# Patient Record
Sex: FEMALE | Race: BLACK OR AFRICAN AMERICAN | Employment: FULL TIME | URBAN - METROPOLITAN AREA
[De-identification: names, ages, dates, MRNs, and addresses within clinical notes are randomized per-mention and may not be internally consistent; named-entity substitution may affect disease eponyms.]

---

## 2018-06-27 ENCOUNTER — HOSPITAL ENCOUNTER (OUTPATIENT)
Age: 23
Discharge: HOME | End: 2018-06-27

## 2018-06-27 PROCEDURE — 86481 TB AG RESPONSE T-CELL SUSP: CPT

## 2018-06-30 LAB — T-SPOT TB TEST: NORMAL

## 2019-05-29 ENCOUNTER — EMPLOYEE WELLNESS (OUTPATIENT)
Dept: OTHER | Age: 24
End: 2019-05-29

## 2019-05-29 LAB
CHOLESTEROL/HDL RATIO: 2.7
CHOLESTEROL: 179 MG/DL
GLUCOSE BLD-MCNC: 93 MG/DL (ref 70–99)
HDLC SERPL-MCNC: 67 MG/DL
LDL CHOLESTEROL: 95 MG/DL (ref 0–130)
PATIENT FASTING?: YES
TRIGL SERPL-MCNC: 84 MG/DL
VLDLC SERPL CALC-MCNC: NORMAL MG/DL (ref 1–30)

## 2019-06-10 VITALS — WEIGHT: 139 LBS

## 2019-10-09 ENCOUNTER — HOSPITAL ENCOUNTER (EMERGENCY)
Age: 24
Discharge: HOME | End: 2019-10-09
Attending: EMERGENCY MEDICINE
Payer: COMMERCIAL

## 2019-10-09 VITALS
WEIGHT: 140 LBS | TEMPERATURE: 98.1 F | RESPIRATION RATE: 16 BRPM | HEART RATE: 79 BPM | DIASTOLIC BLOOD PRESSURE: 68 MMHG | OXYGEN SATURATION: 100 % | HEIGHT: 67 IN | BODY MASS INDEX: 21.97 KG/M2 | SYSTOLIC BLOOD PRESSURE: 112 MMHG
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
## 2019-10-09 DIAGNOSIS — S16.1XXA ACUTE STRAIN OF NECK MUSCLE, INITIAL ENCOUNTER: ICD-10-CM

## 2019-10-09 PROCEDURE — 99283 EMERGENCY DEPT VISIT LOW MDM: CPT

## 2019-10-09 RX ORDER — IBUPROFEN 600 MG/1
600 TABLET ORAL EVERY 6 HOURS PRN
Qty: 24 TABLET | Refills: 0 | Status: SHIPPED | OUTPATIENT
Start: 2019-10-09 | End: 2019-12-30 | Stop reason: ALTCHOICE

## 2019-10-09 RX ORDER — CYCLOBENZAPRINE HCL 5 MG
5 TABLET ORAL NIGHTLY PRN
Qty: 3 TABLET | Refills: 0 | Status: SHIPPED | OUTPATIENT
Start: 2019-10-09 | End: 2019-10-12

## 2019-10-09 ASSESSMENT — PAIN DESCRIPTION - PAIN TYPE: TYPE: ACUTE PAIN

## 2019-10-09 ASSESSMENT — PAIN DESCRIPTION - LOCATION: LOCATION: NECK;SHOULDER;WRIST

## 2019-10-09 ASSESSMENT — ENCOUNTER SYMPTOMS
VOMITING: 0
FACIAL SWELLING: 0
WHEEZING: 0
SHORTNESS OF BREATH: 0
COUGH: 0
ABDOMINAL PAIN: 0
NAUSEA: 0
VOICE CHANGE: 0
DIARRHEA: 0

## 2019-10-09 ASSESSMENT — PAIN SCALES - GENERAL: PAINLEVEL_OUTOF10: 4

## 2019-10-09 ASSESSMENT — PAIN DESCRIPTION - DESCRIPTORS: DESCRIPTORS: DULL

## 2019-10-09 ASSESSMENT — PAIN DESCRIPTION - FREQUENCY: FREQUENCY: CONTINUOUS

## 2019-12-30 ENCOUNTER — HOSPITAL ENCOUNTER (OUTPATIENT)
Age: 24
Setting detail: SPECIMEN
Discharge: HOME | End: 2019-12-30
Payer: COMMERCIAL

## 2019-12-30 ENCOUNTER — OFFICE VISIT (OUTPATIENT)
Dept: OBGYN CLINIC | Age: 24
End: 2019-12-30
Payer: COMMERCIAL

## 2019-12-30 VITALS
RESPIRATION RATE: 16 BRPM | HEIGHT: 67 IN | SYSTOLIC BLOOD PRESSURE: 110 MMHG | DIASTOLIC BLOOD PRESSURE: 64 MMHG | WEIGHT: 136 LBS | BODY MASS INDEX: 21.35 KG/M2

## 2019-12-30 DIAGNOSIS — Z01.419 WOMEN'S ANNUAL ROUTINE GYNECOLOGICAL EXAMINATION: Primary | ICD-10-CM

## 2019-12-30 PROCEDURE — 99385 PREV VISIT NEW AGE 18-39: CPT | Performed by: NURSE PRACTITIONER

## 2019-12-30 RX ORDER — M-VIT,TX,IRON,MINS/CALC/FOLIC 27MG-0.4MG
1 TABLET ORAL DAILY
COMMUNITY

## 2019-12-30 ASSESSMENT — ENCOUNTER SYMPTOMS
RHINORRHEA: 0
BACK PAIN: 0
NAUSEA: 0
COLOR CHANGE: 0
VOMITING: 0
SHORTNESS OF BREATH: 0
ABDOMINAL PAIN: 0
COUGH: 0
CONSTIPATION: 0
DIARRHEA: 0

## 2020-01-07 LAB — CYTOLOGY REPORT: NORMAL

## 2020-07-09 ENCOUNTER — OFFICE VISIT (OUTPATIENT)
Dept: OBGYN CLINIC | Age: 25
End: 2020-07-09
Payer: COMMERCIAL

## 2020-07-09 ENCOUNTER — HOSPITAL ENCOUNTER (OUTPATIENT)
Age: 25
Discharge: HOME | End: 2020-07-09
Payer: COMMERCIAL

## 2020-07-09 ENCOUNTER — HOSPITAL ENCOUNTER (OUTPATIENT)
Age: 25
Setting detail: SPECIMEN
Discharge: HOME | End: 2020-07-09
Payer: COMMERCIAL

## 2020-07-09 VITALS
WEIGHT: 145 LBS | SYSTOLIC BLOOD PRESSURE: 110 MMHG | DIASTOLIC BLOOD PRESSURE: 62 MMHG | BODY MASS INDEX: 22.71 KG/M2 | TEMPERATURE: 97.1 F

## 2020-07-09 LAB
HBV SURFACE AB TITR SER: >1000 MIU/ML
HEPATITIS B SURFACE ANTIGEN: NONREACTIVE
HEPATITIS C ANTIBODY: NONREACTIVE
HIV AG/AB: NONREACTIVE
T. PALLIDUM, IGG: NONREACTIVE

## 2020-07-09 PROCEDURE — 86317 IMMUNOASSAY INFECTIOUS AGENT: CPT

## 2020-07-09 PROCEDURE — 36415 COLL VENOUS BLD VENIPUNCTURE: CPT

## 2020-07-09 PROCEDURE — 99213 OFFICE O/P EST LOW 20 MIN: CPT | Performed by: NURSE PRACTITIONER

## 2020-07-09 PROCEDURE — 86803 HEPATITIS C AB TEST: CPT

## 2020-07-09 PROCEDURE — 86780 TREPONEMA PALLIDUM: CPT

## 2020-07-09 PROCEDURE — 87340 HEPATITIS B SURFACE AG IA: CPT

## 2020-07-09 PROCEDURE — 87389 HIV-1 AG W/HIV-1&-2 AB AG IA: CPT

## 2020-07-09 ASSESSMENT — ENCOUNTER SYMPTOMS
SHORTNESS OF BREATH: 0
VOMITING: 0
WHEEZING: 0
COLOR CHANGE: 0
NAUSEA: 0

## 2020-07-09 NOTE — PROGRESS NOTES
[facility] PHYSICIANS  [facility] [address]  [address] [address]  Dept: [phone]  Dept Fax: [phone]    Maribeth Ruvalcaba is a 30 y.o. female who presents today for her medical conditions/complaintsas noted below. Maribeth Ruvalcaba is c/o of Vaginal Discharge        HPI:     HPI  Pt is here with complaints of a vaginal discharge with Odor for 2 days, denies  Itching,  denies Burning. No UTI sx,  fevers, chills, nausea/vomiting. States did note left side pain intermittently 1 out of 10, states unsure if related to this. No recent antibiotics, changes in soaps. No change in partners, states has had unprotected sex with her partner though. Denies any STI hx.   LMP- 1 week ago      No past medical history on file. No past surgical history on file. Family History   Problem Relation Age of Onset    Hypertension Mother     High Cholesterol Mother     Hypertension Father     High Cholesterol Father        Social History     Tobacco Use    Smoking status: Never Smoker    Smokeless tobacco: Never Used   Substance Use Topics    Alcohol use: Yes      Current Outpatient Medications   Medication Sig Dispense Refill    Multiple Vitamins-Minerals (THERAPEUTIC MULTIVITAMIN-MINERALS) tablet Take 1 tablet by mouth daily       No current facility-administered medications for this visit. No Known Allergies    Health Maintenance   Topic Date Due    Varicella vaccine (1 of 2 - 2-dose childhood series) 04/14/1994    HIV screen  04/14/2008    DTaP/Tdap/Td vaccine (1 - Tdap) 04/14/2012    Flu vaccine (1) 09/01/2020    Cervical cancer screen  12/30/2022    Hepatitis A vaccine  Aged Out    Hepatitis B vaccine  Aged Out    Hib vaccine  Aged Out    Meningococcal (ACWY) vaccine  Aged Out    Pneumococcal 0-64 years Vaccine  Aged Out       Subjective:     Review of Systems   Constitutional: Negative for chills and fever. Respiratory: Negative for shortness of breath and wheezing. Cardiovascular: Negative for chest pain and leg swelling. Gastrointestinal: Negative for nausea and vomiting. Genitourinary: Positive for pelvic pain and vaginal discharge. Negative for dyspareunia, dysuria, flank pain, frequency, menstrual problem, urgency, vaginal bleeding and vaginal pain. Skin: Negative for color change and pallor. Neurological: Negative for dizziness and light-headedness. Objective:     Physical Exam  Vitals signs and nursing note reviewed. Constitutional:       General: She is not in acute distress. Appearance: She is well-developed. She is not diaphoretic. HENT:      Head: Normocephalic and atraumatic. Right Ear: External ear normal.      Left Ear: External ear normal.      Nose: Nose normal.   Eyes:      Pupils: Pupils are equal, round, and reactive to light. Neck:      Musculoskeletal: Normal range of motion and neck supple. Cardiovascular:      Rate and Rhythm: Normal rate and regular rhythm. Heart sounds: Normal heart sounds. No murmur. No friction rub. No gallop. Pulmonary:      Effort: Pulmonary effort is normal.      Breath sounds: Normal breath sounds. No wheezing. Abdominal:      General: Bowel sounds are normal.      Palpations: Abdomen is soft. Abdomen is not rigid. Tenderness: There is no abdominal tenderness. There is no guarding or rebound. Hernia: There is no hernia in the right inguinal area or left inguinal area. Genitourinary:     Labia:         Right: No rash, tenderness, lesion or injury. Left: No rash, tenderness, lesion or injury. Vagina: No signs of injury and foreign body. Vaginal discharge (white/gray) present. No erythema, tenderness or bleeding. Cervix: No cervical motion tenderness, discharge or friability. Uterus: Not enlarged and not tender. Adnexa:         Right: No mass, tenderness or fullness. Left: No mass, tenderness or fullness.      Musculoskeletal: Normal range of motion. Skin:     General: Skin is warm and dry. Findings: No rash. Neurological:      Mental Status: She is alert and oriented to person, place, and time. Cranial Nerves: No cranial nerve deficit. Psychiatric:         Behavior: Behavior normal.         Thought Content: Thought content normal.         Judgment: Judgment normal.       /62 (Site: Right Upper Arm, Position: Sitting, Cuff Size: Medium Adult)   Temp 97.1 °F (36.2 °C)   Wt 145 lb (65.8 kg)   LMP 07/01/2020 (Approximate)   Breastfeeding No   BMI 22.71 kg/m²     Assessment:       Diagnosis Orders   1. Vaginal discharge  VAGINITIS DNA PROBE    C.trachomatis N.gonorrhoeae DNA   2. Screen for STD (sexually transmitted disease)  Hepatitis B Surface Antibody    Hepatitis B Surface Antigen    Hepatitis C Antibody    HIV Screen    T. pallidum Ab       Pelvic exam completed cultures obtained and sent    Plan:      Vaginal discharge- check cultures tx if positive. No CMT tenderness or pelvic tenderness with exam ( will monitor her pain if persistent or severe or fevers/chills will notify us/seek emergent care), denies consitutional symptoms. Monitor for fevers, chills, n/v, abdominal/pelvic let us know if occurs. Encouraged  safe sex practices. Pt requested additional screening labs as ordered. Return if symptoms worsen or fail to improve. Orders Placed This Encounter   Procedures    VAGINITIS DNA PROBE     Standing Status:   Future     Standing Expiration Date:   7/9/2021    C.trachomatis N.gonorrhoeae DNA     Standing Status:   Future     Standing Expiration Date:   7/10/2021     Order Specific Question:   Source:      Answer:   Cervical    Hepatitis B Surface Antibody     Standing Status:   Future     Standing Expiration Date:   7/9/2021    Hepatitis B Surface Antigen     Standing Status:   Future     Standing Expiration Date:   7/9/2021    Hepatitis C Antibody     Standing Status:   Future     Standing Expiration Date: 7/9/2021    HIV Screen     Standing Status:   Future     Standing Expiration Date:   7/9/2021    T. pallidum Ab     Standing Status:   Future     Standing Expiration Date:   8/8/2020         Patient given educational materials - seepatient instructions. Discussed use, benefit, and side effects of prescribed medications. All patient questions answered. Pt voiced understanding. Reviewed health maintenance. Instructed to continue current medications, diet and exercise. Patient agreedwith treatment plan. Follow up as directed. Electronically signed by REBECCA Burleson CNP on 7/9/2020at 8:41 AM      Of the 15 minute duration appointment visit, Michelle Morales CNP spent at least 50% of the face-to-face time in counseling, explanation of diagnosis, planning of further management, and answering all questions.

## 2020-07-09 NOTE — PATIENT INSTRUCTIONS
Patient Education        Vaginitis: Care Instructions  Your Care Instructions     Vaginitis is soreness or infection of the vagina. This common problem can cause itching and burning. And it can cause a change in vaginal discharge. Sometimes it can cause pain during sex. Vaginitis may be caused by bacteria, yeast, or other germs. Some infections that cause it are caught from a sexual partner. Bath products, spermicides, and douches can irritate the vagina too. Some women have this problem during and after menopause. A drop in estrogen levels during this time can cause dryness, soreness, and pain during sex. Your doctor can give you medicine to treat an infection. And home care may help you feel better. For certain types of infections, your sex partner must be treated too. Follow-up care is a key part of your treatment and safety. Be sure to make and go to all appointments, and call your doctor if you are having problems. It's also a good idea to know your test results and keep a list of the medicines you take. How can you care for yourself at home? · If your doctor prescribed antibiotics, take them as directed. Do not stop taking them just because you feel better. You need to take the full course of antibiotics. · Take your medicines exactly as prescribed. Call your doctor if you think you are having a problem with your medicine. · Do not eat or drink anything that has alcohol if you are taking metronidazole (Flagyl). · If you have a yeast infection, use over-the-counter products as your doctor tells you to. Or take medicine your doctor prescribes exactly as directed. · Wash your vaginal area daily with water. You also can use a mild, unscented soap if you want. · Do not use scented bath products. And do not use vaginal sprays or douches. · Put a washcloth soaked in cool water on the area to relieve itching. Or you can take cool baths.   · If you have dryness because of menopause, use estrogen cream or

## 2020-07-10 LAB
DIRECT EXAM: ABNORMAL
Lab: ABNORMAL
SPECIMEN DESCRIPTION: ABNORMAL

## 2020-07-10 RX ORDER — METRONIDAZOLE 500 MG/1
500 TABLET ORAL 2 TIMES DAILY
Qty: 14 TABLET | Refills: 0 | Status: SHIPPED | OUTPATIENT
Start: 2020-07-10 | End: 2020-07-17

## 2020-07-13 LAB
C TRACH DNA GENITAL QL NAA+PROBE: NEGATIVE
N. GONORRHOEAE DNA: NEGATIVE
SPECIMEN DESCRIPTION: NORMAL

## 2020-10-13 ENCOUNTER — HOSPITAL ENCOUNTER (OUTPATIENT)
Age: 25
Setting detail: SPECIMEN
Discharge: HOME | End: 2020-10-13
Payer: COMMERCIAL

## 2020-10-14 LAB — SARS-COV-2 ANTIBODY, TOTAL: NEGATIVE

## 2020-10-21 ENCOUNTER — HOSPITAL ENCOUNTER (OUTPATIENT)
Age: 25
Setting detail: SPECIMEN
Discharge: HOME | End: 2020-10-21
Payer: COMMERCIAL

## 2020-10-21 ENCOUNTER — OFFICE VISIT (OUTPATIENT)
Dept: FAMILY MEDICINE CLINIC | Age: 25
End: 2020-10-21
Payer: COMMERCIAL

## 2020-10-21 VITALS — HEIGHT: 67 IN | WEIGHT: 147 LBS | TEMPERATURE: 97.3 F | BODY MASS INDEX: 23.07 KG/M2

## 2020-10-21 LAB — S PYO AG THROAT QL: NORMAL

## 2020-10-21 PROCEDURE — 87880 STREP A ASSAY W/OPTIC: CPT | Performed by: NURSE PRACTITIONER

## 2020-10-21 PROCEDURE — 99213 OFFICE O/P EST LOW 20 MIN: CPT | Performed by: NURSE PRACTITIONER

## 2020-10-21 RX ORDER — AMOXICILLIN 875 MG/1
875 TABLET, COATED ORAL 2 TIMES DAILY
Qty: 20 TABLET | Refills: 0 | Status: SHIPPED | OUTPATIENT
Start: 2020-10-21 | End: 2020-10-31

## 2020-10-21 ASSESSMENT — ENCOUNTER SYMPTOMS
ABDOMINAL PAIN: 0
TROUBLE SWALLOWING: 0
VOMITING: 0
COUGH: 0
DIARRHEA: 0
SWOLLEN GLANDS: 0
RHINORRHEA: 0
SORE THROAT: 1

## 2020-10-21 NOTE — PROGRESS NOTES
reviewed. Constitutional:       General: She is not in acute distress. Appearance: Normal appearance. She is well-developed. She is not ill-appearing, toxic-appearing or diaphoretic. HENT:      Head: Normocephalic and atraumatic. Right Ear: Tympanic membrane normal.      Left Ear: Tympanic membrane normal.      Nose: Nose normal. No rhinorrhea. Mouth/Throat:      Mouth: Mucous membranes are moist. No oral lesions. Pharynx: Oropharyngeal exudate (greatest on left tonsil) and posterior oropharyngeal erythema present. No uvula swelling. Tonsils: Tonsillar exudate (greatest on left) present. 1+ on the right. 2+ on the left. Eyes:      General: No scleral icterus. Right eye: No discharge. Left eye: No discharge. Neck:      Musculoskeletal: Normal range of motion and neck supple. No neck rigidity. Cardiovascular:      Rate and Rhythm: Normal rate and regular rhythm. Heart sounds: Normal heart sounds. No murmur. Pulmonary:      Effort: Pulmonary effort is normal. No respiratory distress. Breath sounds: Normal breath sounds. No stridor. No wheezing, rhonchi or rales. Abdominal:      General: Bowel sounds are normal.      Palpations: Abdomen is soft. Musculoskeletal: Normal range of motion. General: No signs of injury. Lymphadenopathy:      Cervical: No cervical adenopathy (not significant). Skin:     General: Skin is warm and dry. Coloration: Skin is not jaundiced or pale. Findings: No erythema or rash. Neurological:      General: No focal deficit present. Mental Status: She is alert and oriented to person, place, and time.    Psychiatric:         Mood and Affect: Mood normal.         Behavior: Behavior normal.       Temp 97.3 °F (36.3 °C)   Ht 5' 7\" (1.702 m)   Wt 147 lb (66.7 kg)   LMP 10/12/2020 (Approximate)   Breastfeeding No   BMI 23.02 kg/m²   Results for POC orders placed in visit on 10/21/20   POCT rapid strep A Result Value Ref Range    Strep A Ag None Detected None Detected       Assessment:          Diagnosis Orders   1. Sore throat  POCT rapid strep A    Strep A DNA probe, amplification    amoxicillin (AMOXIL) 875 MG tablet   2. Tonsillitis with exudate  Strep A DNA probe, amplification    amoxicillin (AMOXIL) 875 MG tablet       Plan:    Increase fluids  Good handwashing  Motrin and tylenol as needed as directed if able to take  Gargle warm salt water  New toothbrush if strep (24 hours after starting antibiotic)  Recheck for worsening, change or concern  Follow up with primary care in one week, sooner if worsens    covid symptom can be sore throat  Though she only has exudate on tonsil and no other symptom  Appears most likely tonsillitis, strep, consider mono    Will start amoxicillin  dna strep pending     Orders Placed This Encounter   Medications    amoxicillin (AMOXIL) 875 MG tablet     Sig: Take 1 tablet by mouth 2 times daily for 10 days     Dispense:  20 tablet     Refill:  0         Patient given educational materials - see patientinstructions. Discussed use, benefit, and side effects of prescribed medications. All patient questions answered. Pt voiced understanding.     Electronically signed by REBECCA Merritt 10/21/2020 at 4:17 PM

## 2020-10-21 NOTE — PATIENT INSTRUCTIONS
Increase fluids  Good handwashing  Motrin and tylenol as needed as directed if able to take  Gargle warm salt water  New toothbrush if strep (24 hours after starting antibiotic)  Recheck for worsening, change or concern  Follow up with primary care in one week, sooner if worsens    covid symptom can be sore throat

## 2020-10-22 LAB
DIRECT EXAM: NORMAL
Lab: NORMAL
SPECIMEN DESCRIPTION: NORMAL

## 2020-11-17 ENCOUNTER — TELEMEDICINE (OUTPATIENT)
Dept: OBGYN CLINIC | Age: 25
End: 2020-11-17
Payer: COMMERCIAL

## 2020-11-17 PROBLEM — N94.6 DYSMENORRHEA: Status: ACTIVE | Noted: 2020-11-17

## 2020-11-17 PROBLEM — N92.0 MENORRHAGIA WITH REGULAR CYCLE: Status: ACTIVE | Noted: 2020-11-17

## 2020-11-17 PROCEDURE — 99213 OFFICE O/P EST LOW 20 MIN: CPT | Performed by: NURSE PRACTITIONER

## 2020-11-17 RX ORDER — MISOPROSTOL 200 UG/1
TABLET ORAL
Qty: 2 TABLET | Refills: 0 | Status: SHIPPED | OUTPATIENT
Start: 2020-11-17

## 2020-11-17 ASSESSMENT — ENCOUNTER SYMPTOMS
VOMITING: 0
WHEEZING: 0
PHOTOPHOBIA: 0
SHORTNESS OF BREATH: 0
NAUSEA: 0

## 2020-11-17 NOTE — PATIENT INSTRUCTIONS
Patient Education        Intrauterine Device (IUD) Insertion: Care Instructions  Your Care Instructions     An intrauterine device (IUD) is a very effective method of birth control. It is a small, plastic, T-shaped device that contains copper or hormones. The doctor inserts the IUD into your uterus. You can have an IUD inserted at any time, as long as you aren't pregnant and you don't have a pelvic infection. If you and your doctor discuss it before you give birth, this can be done right after you have your baby. A plastic string tied to the end of the IUD hangs down through the cervix into the vagina. Your doctor may have you feel for the IUD string right after insertion, to be sure you know what it feels like. There are two types of IUDs. The copper IUD works for up to 10 years. The hormonal IUD works for either 3 years or 6 years, depending on which IUD is used. But your doctor may talk to you about leaving it in for longer. The hormonal IUD also reduces menstrual bleeding and cramping. Follow-up care is a key part of your treatment and safety. Be sure to make and go to all appointments, and call your doctor if you are having problems. It's also a good idea to know your test results and keep a list of the medicines you take. How can you care for yourself at home? · It's safe to use while breastfeeding. · You may experience some mild cramping and light bleeding (spotting) for 1 or 2 days. Use a hot water bottle or a heating pad set on low on your belly for pain. · Take an over-the-counter pain medicine, such as acetaminophen (Tylenol), ibuprofen (Advil, Motrin), and naproxen (Aleve) if needed. Read and follow all instructions on the label. · Do not take two or more pain medicines at the same time unless the doctor told you to. Many pain medicines have acetaminophen, which is Tylenol. Too much acetaminophen (Tylenol) can be harmful.   · If you want to check the string of your IUD, insert a finger into your vagina and feel for the cervix, which is at the top of the vagina and feels harder than the rest of your vagina. You should be able to feel the thin, plastic string coming out of the opening of your cervix. If you cannot feel the string, use another form of birth control and make an appointment with your doctor to have the string checked. · If the IUD comes out, save it and call your doctor. Be sure to use another form of birth control while the IUD is out. · Use latex condoms to protect against sexually transmitted infections (STIs), such as gonorrhea and chlamydia. An IUD does not protect you from STIs. Having one sex partner (who does not have STIs and does not have sex with anyone else) is a good way to avoid STIs. When should you call for help? Call 911 anytime you think you may need emergency care. For example, call if:    · You passed out (lost consciousness).     · You have sudden, severe pain in your belly or pelvis. Call your doctor now or seek immediate medical care if:    · You have new belly or pelvic pain.     · You have severe vaginal bleeding. This means that you are soaking through your usual pads or tampons each hour for 2 or more hours.     · You are dizzy or lightheaded, or you feel like you may faint.     · You have a fever and pelvic pain or vaginal discharge.     · You have pelvic pain that is getting worse. Watch closely for changes in your health, and be sure to contact your doctor if:    · You cannot feel the string, or the IUD comes out.     · You feel sick to your stomach, or you vomit.     · You think you may be pregnant. Where can you learn more? Go to https://Best [employer]ceptive [email or web address] org and sign in to your [employer] account. Enter N770 in the Detectent box to learn more about \"Intrauterine Device (IUD) Insertion: Care Instructions. \"     If you do not have an account, please click on the \"Sign Up Now\" link.   Current as of: February 11, 2020               Content Version: 12.6  © 6081-7522 [employer]. Care instructions adapted under license by [facility] ([facility]). If you have questions about a medical condition or this instruction, always ask your healthcare professional. [address] any warranty or liability for your use of this information.

## 2020-11-17 NOTE — PROGRESS NOTES
2020    TELEHEALTH EVALUATION -- Audio/Visual (During QJSJW-88 public health emergency)    HPI:    Kyara Gilmanjesus (:  1995) has requested an audio/video evaluation for the following concern(s):      Amberly Serrano  presents to disscuss contraceptive options. She has a hx of dysmenorrhea and HMB first 2 days of periods. She states they are regular every 28 days and last around 6 days. She denies HMB. 1 tampon/pad an hour. Denies personal or family hx of blood clot or clotting disorder. Has hx of headaches, denies aura. Denies hx liver disease. Denies STI/PID hx. Denies chest pain or pressure, SOB, calf pain or swelling, headaches, or vision changes. HAs been on [facility]- pills before would like to avoid given headaches and she is resident and would be hard to remember bcp daily with her schedule. LMp 20. Review of Systems   Constitutional: Negative for chills, fatigue and fever. Eyes: Negative for photophobia and visual disturbance. Respiratory: Negative for shortness of breath and wheezing. Cardiovascular: Negative for chest pain, palpitations and leg swelling. Gastrointestinal: Negative for nausea and vomiting. Genitourinary: Positive for menstrual problem. Negative for dyspareunia, dysuria, frequency, pelvic pain, vaginal bleeding, vaginal discharge and vaginal pain. Neurological: Negative for dizziness and headaches. Prior to Visit Medications    Medication Sig Taking? Authorizing Provider   miSOPROStol (CYTOTEC) 200 MCG tablet Take one tablet by mouth night prior to the procedure and the second tablet the morning of procedure. Yes REBECCA Toribio - CNP   Multiple Vitamins-Minerals (THERAPEUTIC MULTIVITAMIN-MINERALS) tablet Take 1 tablet by mouth daily  Historical Provider, MD       Social History     Tobacco Use    Smoking status: Never Smoker    Smokeless tobacco: Never Used   Substance Use Topics    Alcohol use:  Yes    Drug use: Never        No Known Allergies, History reviewed. No pertinent past medical history. , History reviewed. No pertinent surgical history. ,   Social History     Tobacco Use    Smoking status: Never Smoker    Smokeless tobacco: Never Used   Substance Use Topics    Alcohol use: Yes    Drug use: Never   ,   Family History   Problem Relation Age of Onset    Hypertension Mother     High Cholesterol Mother     Hypertension Father     High Cholesterol Father        PHYSICAL EXAMINATION:  [ INSTRUCTIONS:  \"[x]\" Indicates a positive item  \"[]\" Indicates a negative item  -- DELETE ALL ITEMS NOT EXAMINED]  Vital Signs: (As obtained by patient/caregiver or practitioner observation)    Blood pressure-  Heart rate-    Respiratory rate-    Temperature-  Pulse oximetry-     Constitutional: [x] Appears well-developed and well-nourished [x] No apparent distress      [] Abnormal-   Mental status  [x] Alert and awake  [] Oriented to person/place/time []Able to follow commands      Eyes:  EOM    []  Normal  [] Abnormal-  Sclera  []  Normal  [] Abnormal -         Discharge []  None visible  [] Abnormal -    HENT:   [x] Normocephalic, atraumatic.   [] Abnormal   [] Mouth/Throat: Mucous membranes are moist.     External Ears [] Normal  [] Abnormal-     Neck: [x] No visualized mass     Pulmonary/Chest: [x] Respiratory effort normal.  [] No visualized signs of difficulty breathing or respiratory distress        [] Abnormal-      Musculoskeletal:   [] Normal gait with no signs of ataxia         [] Normal range of motion of neck        [] Abnormal-       Neurological:        [x] No Facial Asymmetry (Cranial nerve 7 motor function) (limited exam to video visit)          [] No gaze palsy        [] Abnormal-         Skin:        [x] No significant exanthematous lesions or discoloration noted on facial skin         [] Abnormal-            Psychiatric:       [x] Normal Affect [] No Hallucinations        [] Abnormal-     Other pertinent observable physical exam findings-     ASSESSMENT/PLAN:  1. Dysmenorrhea  2. Menorrhagia with regular cycle  3. General counseling and advice for contraceptive management    Discussed and counseled on all forms hormonal contraceptives and she would like to have IUD placed ( she is considering [place]). Discussed R/B/A with IUD. Cultures declined, no concerns STI  Abstain sexual intercourse until IUD placement, if 2 weeks can have urine hcg in office if not needs serum hcg day prior to placement. encouraged to return to office on menses for IUD placement   Cytotec 200mg night before and morning of placement of IUD. Return for on menses. Amrit Abebe is a 30 y.o. female being evaluated by a Virtual Visit (video visit) encounter to address concerns as mentioned above. A caregiver was present when appropriate. Due to this being a TeleHealth encounter (During EDQXK-42 public health emergency), evaluation of the following organ systems was limited: Vitals/Constitutional/EENT/Resp/CV/GI//MS/Neuro/Skin/Heme-Lymph-Imm. Pursuant to the emergency declaration under the [facility], [address] [place] [address] authority and the [employer] Act, this Virtual Visit was conducted with patient's (and/or legal guardian's) consent, to reduce the patient's risk of exposure to COVID-19 and provide necessary medical care. The patient (and/or legal guardian) has also been advised to contact this office for worsening conditions or problems, and seek emergency medical treatment and/or call 911 if deemed necessary. Patient identification was verified at the start of the visit: Yes    Total time spent on this encounter: 15    Services were provided through a video synchronous discussion virtually to substitute for in-person clinic visit. Patient and provider were located at their individual homes.     --REBECCA Roldan CNP on 11/17/2020 at 4:20 PM    An electronic signature was used to authenticate this note. Stable.

## 2020-12-10 ENCOUNTER — PROCEDURE VISIT (OUTPATIENT)
Dept: OBGYN CLINIC | Age: 25
End: 2020-12-10
Payer: COMMERCIAL

## 2020-12-10 VITALS — WEIGHT: 149 LBS | BODY MASS INDEX: 23.34 KG/M2 | SYSTOLIC BLOOD PRESSURE: 110 MMHG | DIASTOLIC BLOOD PRESSURE: 62 MMHG

## 2020-12-10 LAB
CONTROL: PRESENT
PREGNANCY TEST URINE, POC: NEGATIVE

## 2020-12-10 PROCEDURE — 81025 URINE PREGNANCY TEST: CPT | Performed by: NURSE PRACTITIONER

## 2020-12-10 PROCEDURE — 58300 INSERT INTRAUTERINE DEVICE: CPT | Performed by: NURSE PRACTITIONER

## 2020-12-10 NOTE — PROGRESS NOTES
SUBJECTIVE:    CC:    Chief Complaint   Patient presents with    Procedure       Harriet presents today for insertion of Mirena IUD for her complaint of  Dysmenorrhea and menorrhagia. Harriet understands the risks and benefits of the intrauterine device insertion and desires to proceed with its insertion. OBJECTIVE:    /62 (Site: Right Upper Arm, Position: Sitting, Cuff Size: Medium Adult)   Wt 149 lb (67.6 kg)   LMP 12/05/2020 (Approximate)   Breastfeeding No   BMI 23.34 kg/m²      pregnancy test: has abstained for 2 weeks , urine hcg negative today  Gyn history: Cultures: Declined, no hx STI/PID  Medical history: No known contraindication to progestin use. She is not a smoker. The patient denies any family member or herself as having a blood clot in their leg, lung, brain or that any member of her family has had a sudden cardiac death under the age of 35 yo. Procedure: The patient was positioned comfortably on the exam table. After a bi-manual exam; the uterus was found to be  anteverted. There was no cervical motion tenderness. A sterile speculum was inserted. The cervix was visualized and prepped with Betadine. An  allis clamp was applied to the anterior lip of the cervix. A sound was placed through the cervical os in sterile fashion and the uterus was sounded to 7 cm. The Mirena IUD was loaded in the applicator and the indicator placed according to the sound. The applicator was then inserted into the cervix and the Intrauterine Device was placed in the endometrial cavity. The applicator was removed and the strings were trimmed to 3 cm. The patient tolerated the procedure without difficulty. She was instructed to abstain for two weeks and use sapna-pads for the first 8 weeks.  She is to notify the office or go to the nearest Emergency Department if she experiences Abdominal Pain, Temperatures more than 101 F, Odiferous Vaginal Discharge, Dizziness or Shortness of breath. ASSESSMENT:  IUD insertion    PLAN:  1.  IUD counseling was done with Harriet. The  Mirena booklet was given to the patient. The consent was signed and scanned into the chart. The Lot # is charted on the CC. Harriet was given the IUD identification card. 2.  IUD warning signs and symptoms were reviewed with Harriet. 3.  Post-insertion instructions were also given   4. She will return in 1 month for follow up or as needed              Post procedure restrictions were reviewed and given to the patient.

## 2020-12-10 NOTE — PATIENT INSTRUCTIONS
Patient Education      We encourage you abstain for two weeks and use sapna-pads for the first 8 weeks, no tampons. If you are sexually active prior to 7 days after IUD placed use barrier contraceptive. Notify the office or go to the nearest Emergency Department if she experiences Abdominal Pain, Temperatures more than 101 F, Odiferous Vaginal Discharge, Dizziness or Shortness of breath. Learning About Birth Control: Intrauterine Device (IUD)  What is an intrauterine device (IUD)? The intrauterine device (IUD) is used to prevent pregnancy. It's a small, plastic, T-shaped device. Your doctor places the IUD in your uterus. If you and your doctor discuss it before you give birth, this can be done right after you have your baby. You have a choice between a hormonal IUD and a copper IUD. The hormonal IUD can prevent pregnancy for 3 to 6 years, depending on which IUD is used. But your doctor may talk to you about leaving it in for longer. Once you have it, you don't have to do anything else to prevent pregnancy. The copper IUD can prevent pregnancy for 10 years. But your doctor may talk to you about leaving it in for longer. Once you have it, you don't have to do anything else to prevent pregnancy. A string tied to the end of the IUD hangs down through the opening of the uterus (called the cervix) into the vagina. You can check that the IUD is in place by feeling for the string. The IUD usually stays in the uterus until your doctor removes it. How well does it work? In the first year of use:  · When the hormonal IUD is used exactly as directed, fewer than 1 woman out of 100 has an unplanned pregnancy. · When the copper IUD is used exactly as directed, fewer than 1 woman out of 100 has an unplanned pregnancy. Be sure to tell your doctor about any health problems you have or medicines you take. He or she can help you choose the birth control method that is right for you.   What are the advantages of an

## 2021-01-21 ENCOUNTER — E-VISIT (OUTPATIENT)
Dept: FAMILY MEDICINE CLINIC | Age: 26
End: 2021-01-21
Payer: COMMERCIAL

## 2021-01-21 DIAGNOSIS — N30.00 ACUTE CYSTITIS WITHOUT HEMATURIA: Primary | ICD-10-CM

## 2021-01-21 PROCEDURE — 99422 OL DIG E/M SVC 11-20 MIN: CPT | Performed by: FAMILY MEDICINE

## 2021-01-22 RX ORDER — SULFAMETHOXAZOLE AND TRIMETHOPRIM 800; 160 MG/1; MG/1
1 TABLET ORAL 2 TIMES DAILY
Qty: 6 TABLET | Refills: 0 | Status: SHIPPED | OUTPATIENT
Start: 2021-01-22 | End: 2021-01-25

## 2021-02-05 ENCOUNTER — PATIENT MESSAGE (OUTPATIENT)
Dept: OBGYN CLINIC | Age: 26
End: 2021-02-05

## 2021-02-05 RX ORDER — METRONIDAZOLE 500 MG/1
500 TABLET ORAL 2 TIMES DAILY
Qty: 14 TABLET | Refills: 0 | Status: SHIPPED | OUTPATIENT
Start: 2021-02-05 | End: 2021-02-12

## 2021-02-05 NOTE — TELEPHONE ENCOUNTER
From: [place]  Sent: 2/5/2021 11:59 AM EST  To: [facility] Clinical Support Pool  Subject: RE: Prescription Question    The pharmacy at [facility]  ----- Message -----  From: Jacob Corey  Sent: 2/5/21, 9:48 AM  To: [place] Lopez  Subject: RE: Prescription Question    Kelly Marin, Dr Osiel Calderon stated we can call this in for you- what pharmacy did you want us to call this into?      ----- Message -----   From:Harriet Brewster   Sent:2/5/2021 9:18 AM EST   To:REBECCA Hung - CNP   Subject:Prescription Question    Hi,     I think I am getting back BV. I'm having the fishy smell again. Can you send a prescription for the Vaginal metronidazole?     Thanks,    Rianna Bailey

## 2025-06-17 ENCOUNTER — HOSPITAL ENCOUNTER (OUTPATIENT)
Age: 30
Discharge: HOME | End: 2025-06-17

## 2025-06-17 PROCEDURE — 86735 MUMPS ANTIBODY: CPT

## 2025-06-17 PROCEDURE — 86762 RUBELLA ANTIBODY: CPT

## 2025-06-17 PROCEDURE — 86765 RUBEOLA ANTIBODY: CPT

## 2025-06-17 PROCEDURE — 86787 VARICELLA-ZOSTER ANTIBODY: CPT

## 2025-06-17 PROCEDURE — 36415 COLL VENOUS BLD VENIPUNCTURE: CPT

## 2025-06-18 LAB
GAMMA INTERFERON BACKGROUND BLD IA-ACNC: 0.04 IU/ML
M TB IFN-G BLD-IMP: NEGATIVE IU/ML
M TB IFN-G CD4+ BCKGRND COR BLD-ACNC: 0.02 IU/ML (ref 0–0.34)
M TB IFN-G CD4+CD8+ BCKGRND COR BLD-ACNC: 0.01 IU/ML (ref 0–0.34)
MITOGEN IGNF BCKGRD COR BLD-ACNC: 8.26 IU/ML

## 2025-07-24 ENCOUNTER — HOSPITAL ENCOUNTER (EMERGENCY)
Age: 30
Discharge: HOME | End: 2025-07-24
Attending: EMERGENCY MEDICINE
Payer: COMMERCIAL

## 2025-07-24 ENCOUNTER — APPOINTMENT (OUTPATIENT)
Dept: ULTRASOUND IMAGING | Age: 30
End: 2025-07-24
Payer: COMMERCIAL

## 2025-07-24 VITALS
TEMPERATURE: 99 F | RESPIRATION RATE: 18 BRPM | SYSTOLIC BLOOD PRESSURE: 122 MMHG | HEIGHT: 67 IN | HEART RATE: 70 BPM | DIASTOLIC BLOOD PRESSURE: 69 MMHG | OXYGEN SATURATION: 100 % | WEIGHT: 178 LBS | BODY MASS INDEX: 27.94 KG/M2

## 2025-07-24 DIAGNOSIS — N93.9 VAGINAL BLEEDING: Primary | ICD-10-CM

## 2025-07-24 LAB
ALBUMIN SERPL-MCNC: 4.2 G/DL (ref 3.5–5.2)
ALP SERPL-CCNC: 78 U/L (ref 35–104)
ALT SERPL-CCNC: 11 U/L (ref 10–35)
ANION GAP SERPL CALCULATED.3IONS-SCNC: 13 MMOL/L (ref 9–16)
AST SERPL-CCNC: 19 U/L (ref 10–35)
BACTERIA URNS QL MICRO: ABNORMAL
BASOPHILS # BLD: 0.07 K/UL (ref 0–0.2)
BASOPHILS NFR BLD: 1 % (ref 0–2)
BILIRUB SERPL-MCNC: 1 MG/DL (ref 0–1.2)
BILIRUB UR QL STRIP: NEGATIVE
BUN SERPL-MCNC: 7 MG/DL (ref 6–20)
CALCIUM SERPL-MCNC: 9.6 MG/DL (ref 8.6–10.4)
CANDIDA SPECIES: NEGATIVE
CHLORIDE SERPL-SCNC: 102 MMOL/L (ref 98–107)
CLARITY UR: CLEAR
CO2 SERPL-SCNC: 22 MMOL/L (ref 20–31)
COLOR UR: YELLOW
CREAT SERPL-MCNC: 0.8 MG/DL (ref 0.7–1.2)
EOSINOPHIL # BLD: 0.07 K/UL (ref 0–0.44)
EOSINOPHILS RELATIVE PERCENT: 1 % (ref 0–4)
EPI CELLS #/AREA URNS HPF: ABNORMAL /HPF
ERYTHROCYTE [DISTWIDTH] IN BLOOD BY AUTOMATED COUNT: 12.2 % (ref 11.5–14.9)
GARDNERELLA VAGINALIS: NEGATIVE
GFR, ESTIMATED: >90 ML/MIN/1.73M2
GLUCOSE SERPL-MCNC: 92 MG/DL (ref 74–99)
GLUCOSE UR STRIP-MCNC: NEGATIVE MG/DL
HCG SERPL QL: NEGATIVE
HCT VFR BLD AUTO: 38 % (ref 36–46)
HGB BLD-MCNC: 12.6 G/DL (ref 12–16)
HGB UR QL STRIP.AUTO: ABNORMAL
IMM GRANULOCYTES # BLD AUTO: <0.03 K/UL (ref 0–0.3)
IMM GRANULOCYTES NFR BLD: 0 %
KETONES UR STRIP-MCNC: ABNORMAL MG/DL
LEUKOCYTE ESTERASE UR QL STRIP: NEGATIVE
LYMPHOCYTES NFR BLD: 2.22 K/UL (ref 1.1–3.7)
LYMPHOCYTES RELATIVE PERCENT: 36 % (ref 24–44)
MCH RBC QN AUTO: 29.6 PG (ref 26–34)
MCHC RBC AUTO-ENTMCNC: 33.2 G/DL (ref 31–37)
MCV RBC AUTO: 89.4 FL (ref 80–100)
MONOCYTES NFR BLD: 0.61 K/UL (ref 0.1–1.2)
MONOCYTES NFR BLD: 10 % (ref 3–12)
MUCOUS THREADS URNS QL MICRO: ABNORMAL
NEUTROPHILS NFR BLD: 52 % (ref 36–66)
NEUTS SEG NFR BLD: 3.21 K/UL (ref 1.5–8.1)
NITRITE UR QL STRIP: NEGATIVE
NRBC BLD-RTO: 0 PER 100 WBC
PH UR STRIP: 5 [PH] (ref 5–8)
PLATELET # BLD AUTO: 234 K/UL (ref 150–450)
PMV BLD AUTO: 12 FL (ref 8–13.5)
POTASSIUM SERPL-SCNC: 4.1 MMOL/L (ref 3.7–5.3)
PROT SERPL-MCNC: 7.5 G/DL (ref 6.6–8.7)
PROT UR STRIP-MCNC: NEGATIVE MG/DL
RBC # BLD AUTO: 4.25 M/UL (ref 3.95–5.11)
RBC #/AREA URNS HPF: ABNORMAL /HPF
SODIUM SERPL-SCNC: 137 MMOL/L (ref 136–145)
SOURCE: NORMAL
SP GR UR STRIP: 1.02 (ref 1–1.03)
TRICHOMONAS: NEGATIVE
TSH SERPL DL<=0.05 MIU/L-ACNC: 2.99 UIU/ML (ref 0.27–4.2)
UROBILINOGEN UR STRIP-ACNC: NORMAL EU/DL (ref 0–1)
WBC #/AREA URNS HPF: ABNORMAL /HPF
WBC OTHER # BLD: 6.2 K/UL (ref 3.5–11)

## 2025-07-24 PROCEDURE — 87660 TRICHOMONAS VAGIN DIR PROBE: CPT

## 2025-07-24 PROCEDURE — 86900 BLOOD TYPING SEROLOGIC ABO: CPT

## 2025-07-24 PROCEDURE — 36415 COLL VENOUS BLD VENIPUNCTURE: CPT

## 2025-07-24 PROCEDURE — 87510 GARDNER VAG DNA DIR PROBE: CPT

## 2025-07-24 PROCEDURE — 80053 COMPREHEN METABOLIC PANEL: CPT

## 2025-07-24 PROCEDURE — 86901 BLOOD TYPING SEROLOGIC RH(D): CPT

## 2025-07-24 PROCEDURE — 85025 COMPLETE CBC W/AUTO DIFF WBC: CPT

## 2025-07-24 PROCEDURE — 87086 URINE CULTURE/COLONY COUNT: CPT

## 2025-07-24 PROCEDURE — 87491 CHLMYD TRACH DNA AMP PROBE: CPT

## 2025-07-24 PROCEDURE — 99284 EMERGENCY DEPT VISIT MOD MDM: CPT

## 2025-07-24 PROCEDURE — 81001 URINALYSIS AUTO W/SCOPE: CPT

## 2025-07-24 PROCEDURE — 84703 CHORIONIC GONADOTROPIN ASSAY: CPT

## 2025-07-24 PROCEDURE — 84443 ASSAY THYROID STIM HORMONE: CPT

## 2025-07-24 PROCEDURE — 87591 N.GONORRHOEAE DNA AMP PROB: CPT

## 2025-07-24 PROCEDURE — 93975 VASCULAR STUDY: CPT

## 2025-07-24 PROCEDURE — 87480 CANDIDA DNA DIR PROBE: CPT

## 2025-07-24 PROCEDURE — 76817 TRANSVAGINAL US OBSTETRIC: CPT

## 2025-07-24 ASSESSMENT — PAIN SCALES - GENERAL
PAINLEVEL_OUTOF10: 0
PAINLEVEL_OUTOF10: 2
PAINLEVEL_OUTOF10: 0

## 2025-07-24 ASSESSMENT — PAIN - FUNCTIONAL ASSESSMENT
PAIN_FUNCTIONAL_ASSESSMENT: 0-10
PAIN_FUNCTIONAL_ASSESSMENT: 0-10

## 2025-07-24 ASSESSMENT — LIFESTYLE VARIABLES
HOW MANY STANDARD DRINKS CONTAINING ALCOHOL DO YOU HAVE ON A TYPICAL DAY: PATIENT DOES NOT DRINK
HOW OFTEN DO YOU HAVE A DRINK CONTAINING ALCOHOL: NEVER

## 2025-07-24 ASSESSMENT — PAIN DESCRIPTION - LOCATION: LOCATION: ABDOMEN;PELVIS

## 2025-07-24 NOTE — ED PROVIDER NOTES
EMERGENCY DEPARTMENT ENCOUNTER   ATTENDING ATTESTATION     Pt Name: Harriet Brewster  MRN: [id number]  Birthdate 1995  Date of evaluation: 7/24/25       Harriet Brewster is a 30 y.o. female who presents with Vaginal Bleeding (Pt c/o vaginal bleeding (gone through 3/4 of one pad starting today) and left sided abd cramping. Pt is 4 weeks pregnant. ) and Abdominal Cramping  Patient with positive home preg test and starting vaginal bleeding and cramping today. IUP not established.     MDM:   Plan for quant, type and screen, TVUS to rule out ectopic.     Vitals Reviewed:    Vitals:    07/24/25 1804   BP: 122/69   Pulse: 69   Resp: 18   Temp: 99 °F (37.2 °C)   TempSrc: Oral   SpO2: 99%   Weight: 80.7 kg (178 lb)   Height: 1.702 m (5' 7\")       Initial Pain Score: Pain Level: 2 (07/24/25 1804)  Last Pain Score: Pain Level: 2 (07/24/25 1804)      I personally saw and examined the patient. I have reviewed and agree with the resident's findings including all diagnostic interpretations, and treatment plans as written. I was present for the key portions of any procedures performed.    Ashley Tobias MD  Attending Emergency Physician           Ashley Tobias MD  07/24/25 4040

## 2025-07-24 NOTE — ED PROVIDER NOTES
[facility] EMERGENCY DEPARTMENT  Emergency Department Encounter  Emergency Medicine Resident     Pt Name:Harriet Brewster  MRN: [id number]  Birthdate 1995  Date of evaluation: 7/24/25  PCP:  No primary care provider on file.  Note Started: 6:41 PM EDT      CHIEF COMPLAINT       Chief Complaint   Patient presents with    Vaginal Bleeding     Pt c/o vaginal bleeding (gone through 3/4 of one pad starting today) and left sided abd cramping. Pt is 4 weeks pregnant.     Abdominal Cramping       HISTORY OF PRESENT ILLNESS  (Location/Symptom, Timing/Onset, Context/Setting, Quality, Duration, Modifying Factors, Severity.)      Harriet Brewster is a G1, P0 30 y.o. female who presents with a positive pregnancy test, LMP    PAST MEDICAL / SURGICAL / SOCIAL / FAMILY HISTORY      has no past medical history on file.     has no past surgical history on file.    Social History     Socioeconomic History    Marital status: Single     Spouse name: Not on file    Number of children: Not on file    Years of education: Not on file    Highest education level: Not on file   Occupational History    Not on file   Tobacco Use    Smoking status: Never    Smokeless tobacco: Never   Substance and Sexual Activity    Alcohol use: Yes    Drug use: Never    Sexual activity: Yes     Birth control/protection: Condom   Other Topics Concern    Not on file   Social History Narrative    Not on file     Social Drivers of Health     Financial Resource Strain: Not on file   Food Insecurity: Not on file   Transportation Needs: Not on file   Physical Activity: Not on file   Stress: Not on file   Social Connections: Not on file   Intimate Partner Violence: Not on file   Housing Stability: Not on file       Family History   Problem Relation Age of Onset    Hypertension Mother     High Cholesterol Mother     Hypertension Father     High Cholesterol Father        Allergies:  Patient has no known allergies.    Home Medications:  Prior to Admission medications

## 2025-07-25 LAB
ABO + RH BLD: NORMAL
BLOOD BANK COMMENT: NORMAL
C TRACH DNA SPEC QL PROBE+SIG AMP: NEGATIVE
MICROORGANISM SPEC CULT: NORMAL
N GONORRHOEA DNA SPEC QL PROBE+SIG AMP: NEGATIVE
SERVICE CMNT-IMP: NORMAL
SPECIMEN DESCRIPTION: NORMAL
SPECIMEN DESCRIPTION: NORMAL

## 2025-07-25 NOTE — DISCHARGE INSTRUCTIONS
You were seen today in the emergency department for vaginal bleeding, underwent a pelvic exam, which was negative for a yeast infection, bacterial vaginosis, and trichomonas.  Your gonorrhea and Chlamydia labs are still pending, and are available on [facility]/will be tomorrow.  Your pregnancy test was negative, but you should still follow-up with your OB/GYN team with your previously scheduled appointment to ensure continued care.  If you start developing fevers, have increased vaginal bleeding, feel lightheaded, or have other concerns, please return to the emergency department for reevaluation at that time.

## 2025-08-08 ENCOUNTER — TELEMEDICINE (OUTPATIENT)
Dept: OBGYN CLINIC | Age: 30
End: 2025-08-08
Payer: COMMERCIAL

## 2025-08-08 ENCOUNTER — HOSPITAL ENCOUNTER (OUTPATIENT)
Age: 30
Setting detail: SPECIMEN
Discharge: HOME | End: 2025-08-08

## 2025-08-08 DIAGNOSIS — Z67.90 BLOOD TYPE, RH POSITIVE: ICD-10-CM

## 2025-08-08 DIAGNOSIS — O03.9 SPONTANEOUS ABORTION: ICD-10-CM

## 2025-08-08 DIAGNOSIS — O03.9 SPONTANEOUS ABORTION: Primary | ICD-10-CM

## 2025-08-08 LAB — B-HCG SERPL EIA 3RD IS-ACNC: <0.2 MIU/ML

## 2025-08-08 PROCEDURE — 99203 OFFICE O/P NEW LOW 30 MIN: CPT | Performed by: OBSTETRICS & GYNECOLOGY

## 2025-08-27 ENCOUNTER — PATIENT MESSAGE (OUTPATIENT)
Dept: OBGYN CLINIC | Age: 30
End: 2025-08-27

## 2025-08-28 ENCOUNTER — RESULTS FOLLOW-UP (OUTPATIENT)
Dept: OBGYN CLINIC | Age: 30
End: 2025-08-28

## 2025-08-28 ENCOUNTER — HOSPITAL ENCOUNTER (OUTPATIENT)
Age: 30
Setting detail: SPECIMEN
Discharge: HOME | End: 2025-08-28

## 2025-08-28 DIAGNOSIS — N92.6 MISSED MENSES: Primary | ICD-10-CM

## 2025-08-28 DIAGNOSIS — N92.6 MISSED MENSES: ICD-10-CM

## 2025-08-28 LAB — B-HCG SERPL EIA 3RD IS-ACNC: 2422 MIU/ML

## 2025-09-03 ENCOUNTER — TELEPHONE (OUTPATIENT)
Dept: OBGYN CLINIC | Age: 30
End: 2025-09-03

## 2025-09-03 DIAGNOSIS — R89.9 ABNORMAL LABORATORY TEST: Primary | ICD-10-CM

## 2025-09-04 ENCOUNTER — HOSPITAL ENCOUNTER (OUTPATIENT)
Age: 30
Setting detail: SPECIMEN
Discharge: HOME | End: 2025-09-04

## 2025-09-04 DIAGNOSIS — R89.9 ABNORMAL LABORATORY TEST: ICD-10-CM

## 2025-09-04 LAB — B-HCG SERPL EIA 3RD IS-ACNC: NORMAL MIU/ML
